# Patient Record
Sex: FEMALE | Race: WHITE | NOT HISPANIC OR LATINO | URBAN - METROPOLITAN AREA
[De-identification: names, ages, dates, MRNs, and addresses within clinical notes are randomized per-mention and may not be internally consistent; named-entity substitution may affect disease eponyms.]

---

## 2017-01-06 ENCOUNTER — HOSPITAL ENCOUNTER (OUTPATIENT)
Dept: MRI IMAGING | Facility: CLINIC | Age: 37
Discharge: HOME | End: 2017-01-06
Attending: PHYSICIAN ASSISTANT

## 2017-01-06 DIAGNOSIS — M51.26 LUMBAR DISC HERNIATION: ICD-10-CM

## 2017-01-06 DIAGNOSIS — M54.16 LUMBAR RADICULITIS: ICD-10-CM

## 2017-01-09 ENCOUNTER — COMMUNICATION - HEALTHEAST (OUTPATIENT)
Dept: PHYSICAL MEDICINE AND REHAB | Facility: CLINIC | Age: 37
End: 2017-01-09

## 2017-01-09 DIAGNOSIS — M54.16 LUMBAR RADICULITIS: ICD-10-CM

## 2017-01-10 ENCOUNTER — AMBULATORY - HEALTHEAST (OUTPATIENT)
Dept: PHYSICAL MEDICINE AND REHAB | Facility: CLINIC | Age: 37
End: 2017-01-10

## 2017-01-10 DIAGNOSIS — Z91.041 CONTRAST MEDIA ALLERGY: ICD-10-CM

## 2017-01-11 ENCOUNTER — HOSPITAL ENCOUNTER (OUTPATIENT)
Dept: PHYSICAL MEDICINE AND REHAB | Facility: CLINIC | Age: 37
Discharge: HOME | End: 2017-01-11
Attending: PHYSICIAN ASSISTANT

## 2017-01-11 DIAGNOSIS — M51.26 LUMBAR DISC HERNIATION: ICD-10-CM

## 2017-01-11 DIAGNOSIS — M54.16 LUMBAR RADICULITIS: ICD-10-CM

## 2017-01-11 DIAGNOSIS — M79.604 LOW BACK PAIN RADIATING TO RIGHT LEG: ICD-10-CM

## 2017-01-11 DIAGNOSIS — M54.50 LOW BACK PAIN RADIATING TO RIGHT LEG: ICD-10-CM

## 2017-01-25 ENCOUNTER — HOSPITAL ENCOUNTER (OUTPATIENT)
Dept: PHYSICAL MEDICINE AND REHAB | Facility: CLINIC | Age: 37
Discharge: HOME | End: 2017-01-25
Attending: PHYSICIAN ASSISTANT

## 2017-01-25 DIAGNOSIS — M51.26 LUMBAR DISC HERNIATION: ICD-10-CM

## 2017-01-25 DIAGNOSIS — M54.16 LUMBAR RADICULITIS: ICD-10-CM

## 2017-01-30 ENCOUNTER — AMBULATORY - HEALTHEAST (OUTPATIENT)
Dept: PHYSICAL MEDICINE AND REHAB | Facility: CLINIC | Age: 37
End: 2017-01-30

## 2017-01-30 DIAGNOSIS — M79.604 LOW BACK PAIN RADIATING TO RIGHT LEG: ICD-10-CM

## 2017-01-30 DIAGNOSIS — M54.50 LOW BACK PAIN RADIATING TO RIGHT LEG: ICD-10-CM

## 2017-02-10 ENCOUNTER — HOSPITAL ENCOUNTER (OUTPATIENT)
Dept: PHYSICAL MEDICINE AND REHAB | Facility: CLINIC | Age: 37
Discharge: HOME | End: 2017-02-10
Attending: ORTHOPAEDIC SURGERY

## 2017-02-10 DIAGNOSIS — M54.50 LOW BACK PAIN: ICD-10-CM

## 2017-02-22 ENCOUNTER — HOSPITAL ENCOUNTER (OUTPATIENT)
Dept: PHYSICAL MEDICINE AND REHAB | Facility: CLINIC | Age: 37
Discharge: HOME | End: 2017-02-22
Attending: PHYSICIAN ASSISTANT

## 2017-02-22 DIAGNOSIS — M51.26 LUMBAR DISC HERNIATION: ICD-10-CM

## 2017-02-22 DIAGNOSIS — M54.16 LUMBAR RADICULITIS: ICD-10-CM

## 2017-05-22 ENCOUNTER — HOSPITAL ENCOUNTER (OUTPATIENT)
Dept: PHYSICAL MEDICINE AND REHAB | Facility: CLINIC | Age: 37
Discharge: HOME | End: 2017-05-22
Attending: PHYSICIAN ASSISTANT

## 2017-05-22 DIAGNOSIS — M54.16 LUMBAR RADICULITIS: ICD-10-CM

## 2017-05-22 DIAGNOSIS — M51.26 LUMBAR DISC HERNIATION: ICD-10-CM

## 2017-05-22 ASSESSMENT — MIFFLIN-ST. JEOR: SCORE: 1668.3

## 2017-06-12 ENCOUNTER — HOSPITAL ENCOUNTER (OUTPATIENT)
Dept: PHYSICAL MEDICINE AND REHAB | Facility: CLINIC | Age: 37
Discharge: HOME | End: 2017-06-12
Attending: PHYSICIAN ASSISTANT

## 2017-06-12 DIAGNOSIS — Z91.041 CONTRAST MEDIA ALLERGY: ICD-10-CM

## 2017-06-12 DIAGNOSIS — M54.16 LUMBAR RADICULITIS: ICD-10-CM

## 2017-06-15 ENCOUNTER — HOSPITAL ENCOUNTER (OUTPATIENT)
Dept: PHYSICAL MEDICINE AND REHAB | Facility: CLINIC | Age: 37
Discharge: HOME | End: 2017-06-15
Attending: PHYSICIAN ASSISTANT

## 2017-06-15 ENCOUNTER — AMBULATORY - HEALTHEAST (OUTPATIENT)
Dept: PHYSICAL MEDICINE AND REHAB | Facility: CLINIC | Age: 37
End: 2017-06-15

## 2017-06-15 DIAGNOSIS — Z98.1 STATUS POST LUMBAR SPINAL FUSION: ICD-10-CM

## 2017-06-15 DIAGNOSIS — M54.16 LUMBAR RADICULOPATHY: ICD-10-CM

## 2017-06-15 ASSESSMENT — MIFFLIN-ST. JEOR: SCORE: 1669.67

## 2017-06-20 ENCOUNTER — HOSPITAL ENCOUNTER (OUTPATIENT)
Dept: RADIOLOGY | Facility: HOSPITAL | Age: 37
Discharge: HOME | End: 2017-06-20
Attending: PHYSICIAN ASSISTANT

## 2017-06-20 DIAGNOSIS — M54.16 LUMBAR RADICULOPATHY: ICD-10-CM

## 2017-06-22 ENCOUNTER — COMMUNICATION - HEALTHEAST (OUTPATIENT)
Dept: PHYSICAL MEDICINE AND REHAB | Facility: CLINIC | Age: 37
End: 2017-06-22

## 2017-06-22 DIAGNOSIS — M54.16 LUMBAR RADICULITIS: ICD-10-CM

## 2017-06-26 ENCOUNTER — AMBULATORY - HEALTHEAST (OUTPATIENT)
Dept: PHYSICAL MEDICINE AND REHAB | Facility: CLINIC | Age: 37
End: 2017-06-26

## 2017-07-06 ENCOUNTER — HOSPITAL ENCOUNTER (OUTPATIENT)
Dept: PHYSICAL MEDICINE AND REHAB | Facility: CLINIC | Age: 37
Discharge: HOME | End: 2017-07-06
Attending: PHYSICIAN ASSISTANT

## 2017-07-06 DIAGNOSIS — M51.26 LUMBAR DISC HERNIATION: ICD-10-CM

## 2017-07-06 DIAGNOSIS — M54.16 LUMBAR RADICULITIS: ICD-10-CM

## 2017-07-06 RX ORDER — NAPROXEN 500 MG/1
500 TABLET ORAL 2 TIMES DAILY PRN
Qty: 60 TABLET | Refills: 0 | Status: SHIPPED | OUTPATIENT
Start: 2017-07-06

## 2017-07-12 ENCOUNTER — OFFICE VISIT - HEALTHEAST (OUTPATIENT)
Dept: FAMILY MEDICINE | Facility: CLINIC | Age: 37
End: 2017-07-12

## 2017-07-12 DIAGNOSIS — Z86.32 HISTORY OF GESTATIONAL DIABETES: ICD-10-CM

## 2017-07-12 DIAGNOSIS — R03.0 ELEVATED BLOOD PRESSURE READING WITHOUT DIAGNOSIS OF HYPERTENSION: ICD-10-CM

## 2017-07-12 DIAGNOSIS — Z01.818 PRE-OP EXAM: ICD-10-CM

## 2017-07-12 DIAGNOSIS — M54.16 LUMBAR RADICULOPATHY: ICD-10-CM

## 2017-07-12 DIAGNOSIS — L83 ACANTHOSIS NIGRICANS: ICD-10-CM

## 2017-07-12 LAB — HBA1C MFR BLD: 5.6 % (ref 3.5–6)

## 2017-07-12 ASSESSMENT — MIFFLIN-ST. JEOR: SCORE: 1649.82

## 2017-07-12 NOTE — ASSESSMENT & PLAN NOTE
Previous measurements have been done with an electric blood pressure cuff and are therefore not entirely reliable.  She is elevated today with 2 checks of her blood pressure.  I suspect she doeshave a diagnosis of hypertension.  EKG was obtained and was negative.  No left ventricular hypertrophy.  Will check a thyroid hormone today.  Additionally basic metabolic panel.  Will initiate hypertensive therapy with hydrochlorothiazide 12.5 mg.  The patient will return to clinic on Thursday 7/20 for blood pressure check as well as potassium check in anticipation for her surgery the following week.

## 2017-07-12 NOTE — ASSESSMENT & PLAN NOTE
This patient has physical exam characteristics consistent with insulin resistance.  She has a history of gestational diabetes.  Both her parents have diabetes.  We will screen her for diabetes today as we draw blood.  She will require ongoing testing going forward to ensure that she does not develop insulin resistance, prediabetes, diabetes.

## 2017-07-13 LAB
ATRIAL RATE - MUSE: 87 BPM
DIASTOLIC BLOOD PRESSURE - MUSE: NORMAL MMHG
INTERPRETATION ECG - MUSE: NORMAL
P AXIS - MUSE: 18 DEGREES
PR INTERVAL - MUSE: 136 MS
QRS DURATION - MUSE: 70 MS
QT - MUSE: 362 MS
QTC - MUSE: 435 MS
R AXIS - MUSE: 9 DEGREES
SYSTOLIC BLOOD PRESSURE - MUSE: NORMAL MMHG
T AXIS - MUSE: 7 DEGREES
VENTRICULAR RATE- MUSE: 87 BPM

## 2017-07-19 ENCOUNTER — AMBULATORY - HEALTHEAST (OUTPATIENT)
Dept: NURSING | Facility: CLINIC | Age: 37
End: 2017-07-19

## 2017-07-19 ENCOUNTER — AMBULATORY - HEALTHEAST (OUTPATIENT)
Dept: FAMILY MEDICINE | Facility: CLINIC | Age: 37
End: 2017-07-19

## 2017-07-19 ENCOUNTER — AMBULATORY - HEALTHEAST (OUTPATIENT)
Dept: LAB | Facility: CLINIC | Age: 37
End: 2017-07-19

## 2017-07-19 DIAGNOSIS — Z01.818 PRE-OP EXAM: ICD-10-CM

## 2017-07-19 DIAGNOSIS — R03.0 ELEVATED BLOOD PRESSURE READING WITHOUT DIAGNOSIS OF HYPERTENSION: ICD-10-CM

## 2017-07-19 RX ORDER — HYDROCHLOROTHIAZIDE 25 MG/1
25 TABLET ORAL DAILY
Qty: 30 TABLET | Refills: 1 | Status: SHIPPED | OUTPATIENT
Start: 2017-07-19

## 2017-07-21 ASSESSMENT — MIFFLIN-ST. JEOR: SCORE: 1649.26

## 2017-07-23 ENCOUNTER — ANESTHESIA - HEALTHEAST (OUTPATIENT)
Dept: SURGERY | Facility: HOSPITAL | Age: 37
End: 2017-07-23

## 2017-07-24 ENCOUNTER — SURGERY - HEALTHEAST (OUTPATIENT)
Dept: SURGERY | Facility: HOSPITAL | Age: 37
End: 2017-07-24

## 2017-07-24 ASSESSMENT — MIFFLIN-ST. JEOR: SCORE: 1653.79

## 2017-08-03 ENCOUNTER — HOSPITAL ENCOUNTER (OUTPATIENT)
Dept: RADIOLOGY | Facility: HOSPITAL | Age: 37
Discharge: HOME | End: 2017-08-03
Attending: PHYSICIAN ASSISTANT

## 2017-08-03 DIAGNOSIS — M54.16 LUMBAR RADICULOPATHY: ICD-10-CM

## 2017-08-08 ENCOUNTER — HOSPITAL ENCOUNTER (OUTPATIENT)
Dept: PHYSICAL MEDICINE AND REHAB | Facility: CLINIC | Age: 37
Discharge: HOME | End: 2017-08-08
Attending: PHYSICIAN ASSISTANT

## 2017-08-08 DIAGNOSIS — M54.16 LUMBAR RADICULOPATHY: ICD-10-CM

## 2017-08-08 DIAGNOSIS — G47.00 INSOMNIA: ICD-10-CM

## 2017-08-08 RX ORDER — ESZOPICLONE 2 MG/1
2 TABLET, FILM COATED ORAL DAILY
Qty: 20 TABLET | Refills: 0 | Status: SHIPPED | OUTPATIENT
Start: 2017-08-08

## 2017-09-01 ENCOUNTER — HOSPITAL ENCOUNTER (OUTPATIENT)
Dept: RADIOLOGY | Facility: HOSPITAL | Age: 37
Discharge: HOME | End: 2017-09-01
Attending: PHYSICIAN ASSISTANT

## 2017-09-01 DIAGNOSIS — M54.16 LUMBAR RADICULOPATHY: ICD-10-CM

## 2017-09-05 ENCOUNTER — HOSPITAL ENCOUNTER (OUTPATIENT)
Dept: PHYSICAL MEDICINE AND REHAB | Facility: CLINIC | Age: 37
Discharge: HOME | End: 2017-09-05
Attending: PHYSICIAN ASSISTANT

## 2017-09-05 DIAGNOSIS — Z98.1 S/P LUMBAR FUSION: ICD-10-CM

## 2017-09-05 DIAGNOSIS — M54.16 LUMBAR RADICULOPATHY: ICD-10-CM

## 2021-05-30 VITALS — BODY MASS INDEX: 37.93 KG/M2 | WEIGHT: 221 LBS

## 2021-05-30 VITALS — WEIGHT: 226.7 LBS | HEIGHT: 64 IN | BODY MASS INDEX: 38.7 KG/M2

## 2021-05-30 VITALS — WEIGHT: 221 LBS | BODY MASS INDEX: 37.93 KG/M2

## 2021-05-31 VITALS — WEIGHT: 219.13 LBS | BODY MASS INDEX: 36.51 KG/M2 | HEIGHT: 65 IN

## 2021-05-31 VITALS — BODY MASS INDEX: 38.96 KG/M2 | WEIGHT: 227 LBS

## 2021-05-31 VITALS — HEIGHT: 65 IN | WEIGHT: 220 LBS | BODY MASS INDEX: 36.65 KG/M2

## 2021-05-31 VITALS — BODY MASS INDEX: 38.76 KG/M2 | WEIGHT: 227 LBS | HEIGHT: 64 IN

## 2021-05-31 VITALS — BODY MASS INDEX: 38.62 KG/M2 | WEIGHT: 225 LBS

## 2021-06-08 NOTE — PROGRESS NOTES
Assessment:   Nat Brooks is a 36 y.o. y.o. female with past medical history significant for depression, gestational diabetes, fibromyalgia, endometriosis who presents today for follow-up regarding right low back pain with radiation into the right lower extremity with associated numbness and tingling in the distribution of the right S1 nerve root.  The patient is a history of a right L5-S1 microlumbar discectomy 2014.  An MRI of the lumbar spine shows a recurrent right L5-S1 disc protrusion which abuts the right S1 nerve root.  The patient has a sensory deficit in the right S1 distribution.  She did not demonstrate any plantar flexion weakness on exam today.       Plan:     A shared decision making plan was used.  The patient's values and choices were respected.  The following represents what was discussed and decided upon by the physician assistant and the patient.      1.  DIAGNOSTIC TESTS:  I reviewed the MRI of the lumbar spine.  No further diagnostic tests were ordered.    2.  PHYSICAL THERAPY: I encouraged the patient to schedule physical therapy at the [facility].  I had placed that order when I saw the patient in consultation on December 30.    3.  MEDICATIONS:    -I refilled the patient's hydrocodone/acetaminophen 5/325 mg 1 tab every 8 hours as needed, #15 with no refills.  I did check the [place] prescription monitoring database.  She has not received any opioids since I saw her on December 30, 2016.  At that time I provided 15 tabs.  - the patient can continue using gabapentin.  She is currently using 6 mg in the morning, 300 mg in the afternoon, and 600 mg at bedtime.  She is titrating her dose up to 900 mg 3 times daily.  -The patient can continue using naproxen twice daily.  -The patient completed her Medrol Dosepak.  -The patient completed a prednisone prep in preparation for the injection today.    4.  INTERVENTIONS:  The patient is scheduled for a right L5-S1,  S1-S2 transforaminal epidural steroid injection this afternoon with Dr. Ward.    5.  PATIENT EDUCATION:  I told the patient that we will try to treat her pain conservatively.  If her pain does not improve with conservative treatment, she may need to follow up with her spine surgeon.  Hopefully, we'll be able to manage this without further surgery.  -The patient works as a [employer] employee. Her job is sedentary.  She has been able to continue working.  I recommended that she alternate sitting to standing every 30-60 minutes.  She did have a sit to stand workstation installed.    6.  FOLLOW-UP: I will see the patient back in clinic for a 2 week post procedure follow-up visit.  She has any questions or concerns.  Meantime, she should not hesitate to contact our clinic.    Subjective:     Nat Brooks is a 36 y.o. female who presents today for follow-up regarding her low back pain with radiation into the right lower extremity with associated numbness and tingling.  I saw the patient in consultation on December 30, 2016.  At that time I ordered an MRI of the lumbar spine for further evaluation.  This showed a recurrent right L5-S1 disc protrusion.  The patient is scheduled for a right L5-S1, S1-S2 transforaminal epidural steroid injection this afternoon with Dr. Ward.  The patient states that she did have some relief of her pain since she was last seen when she was taking the Medrol Dosepak.  However, once she finished the Medrol Dosepak, her pain returned.  She is also finding the gabapentin to be helpful.    The patient with right-sided low back pain.  The pain radiates into the right buttock, down the posterior thigh, into the posterior calf, ending at the heel.  She has numbness in the heel.  She rates her pain today as a 4-10.  At its best is a 4-10.  At its worse it is a 10 out of 10.  The patient's pain is aggravated with sitting in any one position for too long.  Her pain is alleviated  temporarily with repositioning.  She denies any new symptoms since she was last seen.  She denies any weakness.  She denies any left-sided symptoms.    I had ordered physical therapy for the patient.  She has not yet scheduled that.  She is currently using gabapentin 600 mg in the morning, 300 mg in the afternoon, and 600 mg at bedtime.  She is tolerating this dose well.  She is also using naproxen twice daily, since she completed her Medrol Dosepak.  She is also using Vicodin about 1 tab every other day.  She is out of that medication and does request a refill.    Past medical history is reviewed and is unchanged in the interim.    Family history is reviewed and is unchanged in the interim.    Review of Systems:  Positive for numbness/tingling.  Negative for loss of bowel/bladder control, footdrop, weakness, headache, dizziness, nausea/vomiting, blurry vision, balance changes.     Objective:   CONSTITUTIONAL:  Vital signs as above.  No acute distress.  The patient is well nourished and well groomed.    PSYCHIATRIC:  The patient is awake, alert, oriented to person, place and time.  The patient is answering questions appropriately with clear speech.  Normal affect.  HEENT: Normocephalic, atraumatic.  Sclera clear.    SKIN:  Skin over the face, posterior torso, bilateral upper and lower extremities is clean, dry, intact without rashes.  MUSCULOSKELETAL:  Gait is mildly antalgic, favoring the right.   The patient has 5/5 strength for the bilateral hip flexors, knee flexors/extensors, ankle dorsiflexors/plantar flexors, ankle evertors/invertors.    NEUROLOGICAL:  1+ patellar, trace achilles reflexes which are symmetric bilaterally.  No ankle clonus bilaterally.  Sensation to light touch is intact in the bilateral L4, L5, and S1 dermatomes.       RESULTS:  MRI of the lumbar spine from [facility] dated January 6, 2017 was reviewed.  There are postoperative changes of her right L5-S1 hemilaminectomy.  The majority of the  disc protrusion that was present on her previous scan is no longer evident and the thecal sac is adequately decompressed.  There is a small recurrent disc protrusion which abuts the right S1 nerve root.  There may be some scar tissue associated with this.  There is no foraminal stenosis.  There are prominent endplate changes and disc degeneration at this level.  The upper lumbar levels show no new disc herniation, central canal, or neural foraminal stenosis.  Please see report for further details.

## 2021-06-08 NOTE — PROGRESS NOTES
Assessment:   Nat Brooks is a 37 y.o. y.o. female with past medical history significant for depression, gestational diabetes, fibromyalgia, endometriosis who presents today for follow-up regarding right low back pain with radiation into the right lower extremity with associated numbness and tingling in the distribution of the right S1 nerve root.  The patient has a history of a right L5-S1 microlumbar discectomy 2014.  An MRI of the lumbar spine shows a recurrent right L5-S1 disc protrusion which abuts the right S1 nerve root.  The patient is status post a right L5-S1, S1-S2 transforaminal epidural steroid injection on January 11, 2017 which provided 70% relief of her pain which only lasted for 12 days.  Over the past 2 days, the patient felt her same pain returned.  She was neurologically intact on exam today except for a sensory deficit in the right S1 dermatome.       Plan:     A shared decision making plan was used.  The patient's values and choices were respected.  The following represents what was discussed and decided upon by the physician assistant and the patient.      1.  DIAGNOSTIC TESTS:  I reviewed the MRI of the lumbar spine.  No further diagnostic tests were ordered.    2.  PHYSICAL THERAPY: I had ordered physical therapy for the patient on December 30, 2017.  The patient has not yet started.  I strongly encouraged her to begin physical therapy.    3.  MEDICATIONS:  I refilled the patient's naproxen 5 mg twice daily as needed.  -The patient can continue using gabapentin 600 mg 3 times daily.  -The patient has Norco which she uses very sparingly for pain.  She declined a offer for a refill today.    4.  INTERVENTIONS:  I offered the patient a repeat right L5-S1, S1-S2 transforaminal epidural steroid injection.  I told the patient I cannot guarantee would provide any longer lasting relief, but I think would be reasonable to try if she would like to avoid surgery.  The patient indicated she  would like to think about that.  She wants to proceed. If the patient does want a repeat injection she will need to be pre-treated with prednisone.    5.  REFERRALS:  I offered her referral to [facility] neurosurgery.  The patient has a history of a right L5-S1 microlumbar discectomy 2014 at [facility].  She does not remember who her surgeon was.  The patient indicated she would like to also think about this.  She will call our clinic and let us know if she would like a referral.  She did see any of the neurosurgeons.    6.  WORKABILITY: The patient works at a [employer].  She is continuing to work.  She had a sit to stand work station installed.  She declined the need for any paperwork done today.  She does state that she has been leaving early some days because of her pain.    7.  FOLLOW-UP: We will await the patient's phone call letting us know her preferences regarding a repeat epidural steroid injection and/or a surgical referral.  I have not scheduled any additional follow-ups at this point.    Subjective:     Nat Brooks is a 37 y.o. female who presents today for follow-up regarding right low back pain with radiation into the right lower extremity with associated numbness and tingling.  The patient is status post right L5-S1, S1-S2 transforaminal epidural steroid injection on January 11, 2017.  The patient presents this provided 70% relief of her pain for about 12 days.  Over the past 2 days, she has felt her pain returning.    The patient planes of right-sided low back pain.  The pain radiates into the right buttock, down the posterior thigh, into the posterior calf.  She has numbness and tingling in the right heel.  She rates her pain today as a 4-10.  At its best it is a 3 out of 10.  At its worse it is an 8 or 10.  Her pain is aggravated with bending.  She denies any alleviating factors.  The patient does state that she is having less pain when she first wakes up in the morning compared with  before her injection.  However, as the day progresses her pain does become more severe.  She denies any weakness in her legs.    The patient has not yet started physical therapy.  I had ordered this for her on December 30.  She is using naproxen 5 mg twice daily.  She uses gabapentin 600 mg 3 times daily.  She has hydrocodone/acetaminophen available which she uses sparingly.    The patient works at a [employer].  She is continuing to work.  They installed a sit to stand workstation for her.  She states that she has needed to leave early a few days because of her pain.    Past medical history is reviewed and unchanged interim.    Family history is reviewed and is unchanged in the interim.    Review of Systems:  Positive for numbness/tingling.  Negative for loss of bowel/bladder control, footdrop, weakness, headache, dizziness, nausea/vomiting, blurry vision, balance changes.     Objective:   CONSTITUTIONAL:  Vital signs as above.  No acute distress.  The patient is well nourished and well groomed.    PSYCHIATRIC:  The patient is awake, alert, oriented to person, place and time.  The patient is answering questions appropriately with clear speech.  Normal affect.  HEENT: Normocephalic, atraumatic.  Sclera clear.    SKIN:  Skin over the face, posterior torso, bilateral upper and lower extremities is clean, dry, intact without rashes.  MUSCULOSKELETAL:  Gait is non-antalgic.   The patient has 5/5 strength for the bilateral hip flexors, knee flexors/extensors, ankle dorsiflexors/plantar flexors, ankle evertors/invertors.  Lumbar flexion and extension both severely restricted.  NEUROLOGICAL:  1+ patellar, trace achilles reflexes which are symmetric bilaterally.  No ankle clonus bilaterally.  Subjective diminished sensation in the right S1 dermatome.     RESULTS:  MRI of the lumbar spine from [facility] dated January 6, 2017 was reviewed. There are postoperative changes of her right L5-S1 hemilaminectomy. The majority of  the disc protrusion that was present on her previous scan is no longer evident and the thecal sac is adequately decompressed. There is a small recurrent disc protrusion which abuts the right S1 nerve root. There may be some scar tissue associated with this. There is no foraminal stenosis. There are prominent endplate changes and disc degeneration at this level. The upper lumbar levels show no new disc herniation, central canal, or neural foraminal stenosis. Please see report for further details.

## 2021-06-08 NOTE — PROGRESS NOTES
[facility] SPINE SURGERY SERVICE OFFICE VISIT    2/10/2017     Nat Brooks is a 37 y.o. female who is sent to us in consultation by Marci Arellano  for the evaluation of severe back and recurrent right leg pain           HPI: Nat Brooks is a 37 y.o. y.o. female with past medical history significant for depression, gestational diabetes, fibromyalgia, endometriosis who presents today for follow-up regarding right low back pain with radiation into the right lower extremity with associated numbness and tingling in the distribution of the right S1 nerve root. The patient has a history of a right L5-S1 microlumbar discectomy 2014. An MRI of the lumbar spine shows a recurrent right L5-S1 disc protrusion which abuts the right S1 nerve root. The patient is status post a right L5-S1, S1-S2 transforaminal epidural steroid injection on January 11, 2017 which provided 70% relief of her pain which only lasted for 12 days.  Patient says that her symptoms are exacerbated by any type of activity standing and walking and relieved to some degree by lying down.  Patient also has some relief from pain medications.  At this time she is miserable and frustrated and looking for a more long-term fix for her lumbar spine condition.    Past Medical History:   Diagnosis Date     Chronic back pain      Obese      Past Surgical History:   Procedure Laterality Date     MICRODISCECTOMY LUMBAR           REVIEW OF SYSTEMS:  ROS reviewed with pt as documented on pt health form of 2/10/2017.    Negative cardiac, pulmonary, hematological.  No family hx of anesthetic reactions.  No family hx of hypercoagulability.       MEDICATIONS:  Current Outpatient Prescriptions   Medication Sig Dispense Refill     diazePAM (VALIUM) 5 MG tablet Take one tab by mouth one - 1.5 hours prior to MRI.  Bring second tab to MRI appointment, may take again 15 minutes before MRI. 2 tablet 0     gabapentin (NEURONTIN) 300 MG capsule Take 1 capsule (300  mg total) by mouth daily. Increase by 1 tab every 3 days.  Max dose 900 mg tid 180 capsule 2     HYDROcodone-acetaminophen 5-325 mg per tablet Take 1 tablet by mouth every 8 (eight) hours as needed for pain. 15 tablet 0     naproxen (NAPROSYN) 500 MG tablet Take 1 tablet (500 mg total) by mouth 2 (two) times a day as needed. Take with food. 60 tablet 1     predniSONE (DELTASONE) 50 MG tablet Take 1 tab 13 hours before procedure. 2nd tab 7 hours before. 3rd tab 1 hour before. 3 tablet 0     No current facility-administered medications for this encounter.          ALLERGIES/SENSITIVITIES:     Allergies   Allergen Reactions     Contrast [Iohexol]      Gadolinium-Containing Contrast Media      Shellfish Containing Products Itching and Swelling       PERTINENT SOCIAL HISTORY:   Social History     Social History     Marital status: Single     Spouse name: N/A     Number of children: N/A     Years of education: N/A     Social History Main Topics     Smoking status: Current Every Day Smoker     Smokeless tobacco: Not on file     Alcohol use Yes      Comment: occasional     Drug use: Yes     Special: Marijuana     Sexual activity: Not on file     Other Topics Concern     Not on file     Social History Narrative         FAMILY HISTORY:  No family history on file.     PHYSICAL EXAM:   Constitutional: There were no vitals taken for this visit.     Mental Status: A & O in no acute distress.  Affect is appropriate.  Speech is fluent.  Recent and remote memory are intact.  Attention span and concentration are normal.     Observation: Grossly intact    Range of Motion: Less than 50% in all directions    Provacative Testing: Straight leg raising at 20  on the right     Motor: No pronator drift of upper extremity. Normal bulk and tone all muscle groups of upper and lower extremities.      Sensory: Sensation intact bilaterally to light touch.      Gait Pattern: Antalgic gait favoring the right lower extremity     Coordination:   Heel/toe/  gait intact.     tandem gait preserved     Reflexes; knee/ ankle jerk intact.  Negative babinski/ clonus.    IMAGING: I personally reviewed all radiographic images    MRI     Severe degenerative disc disease L5-S1 with Modic endplate changes.  Recurrent central and right disc herniation with severe S1 nerve root impingement       CONSULTATION ASSESSMENT AND PLAN:      Patient presents today for a complex spine surgery evaluation.  A shared treatment decision was performed alongside the patient.  After extensive discussion with the patient patient is opting strongly to move in the direction of an anterior posterior spinal fusion to address her recurrent disc herniation and significant chronic back pain and leg pain.  Risks and benefits were extensively discussed. Anterior spinal fusion risk were discussed including vascular injury, infection, bleeding and others. Posterior spinal fusion risk were discussed including but not limited to nerve root injury, hardware complications, infection, bleeding, pseudoarthrosis, and other potential catastrophic conditions.  If he has any other questions or concerns he certainly not hesitate to contact our surgical team and the [facility] at any time.  Patient was specifically instructed on pre-operative and scheduling protocol and was given the appropriate contact information for the surgery scheduler and the general information line.      Surgical plan:  1.  Anterior spinal fusion L5-S1 with instrumentation  2.  Posterior lumbar fusion L5-S1 with instrumentation through paraspinal muscle splitting approach        Bhanu Pittman MD  Spine Surgeon  [facility]      Cc:   No Primary Care Provider  [address] [place] [address]

## 2021-06-09 NOTE — PROGRESS NOTES
Assessment:   Nat Brooks is a 37 y.o. y.o. female with past medical history significant for depression, gestational diabetes, fibromyalgia, endometriosis who presents today for follow-up regarding right-sided low back pain with radiation into the right lower extremity with associated numbness and tingling in the distribution of the right S1 nerve root.  The patient has a history of a right L5-S1 microlumbar discectomy 2014.  MRI of lumbar spine shows a recurrent right L5-S1 disc herniation which abuts the right S1 nerve root.  The patient has been offered an anterior/posterior L5-S1 fusion by Dr. Pittman.  She is awaiting insurance authorization.  She returns today because she needs a refill of her hydrocodone/acetaminophen.  She is using 1 tablet every other day.       Plan:     A shared decision making plan was used.  The patient's values and choices were respected.  The following represents what was discussed and decided upon by the physician assistant and the patient.      1.  DIAGNOSTIC TESTS:  I reviewed the MRI of the lumbar spine.  No further diagnostic tests were ordered.    2.  PHYSICAL THERAPY: I had ordered physical therapy for the patient on December 30, 2017. The patient has not yet started. I have encouraged her to begin physical therapy.    3.  MEDICATIONS:    - I Refilled the patient's hydrocodone/acetaminophen 5/325 mg 1 tab every 8 hours as needed, #15 with no refills.  She is currently using 1 tab about every other day I asked that the patient uses this sparingly as possible.  I took the [place] prescription monitoring database.  Her most recent prescription for this was on January 11, 2017 for 15 tabs.  We reviewed the risks and benefits of this medication.  I told the patient that I can continue to refill this for her until she has her spine surgery.  If she is not going to have spine surgery within the next 3-4 months, I will not longer prescribed for her and she will need to  see a pain clinic.  -I refilled the patient's cyclobenzaprine 5-10 mg 3 times daily as needed.  -The patient can continue using naproxen 500 mg twice daily as needed.  - the patient discontinued gabapentin.  She developed blurry vision.    4.  INTERVENTIONS:  I offered the patient a repeat right L5-S1, S1-S2 transforaminal epidural steroid injection.  This injection was previous CT performed on January 11, 2017 and provided 70% relief of her pain but only lasted for 12 days.  Due to financial concerns, the patient does not feel that she could proceed with a repeat injection at this time.    5.  PATIENT EDUCATION:    -The patient's surgery authorization is currently delayed because she is a smoker.  I urged the patient to quit smoking.  I explained that continuing to smoke increases her risk of compensation with surgery including pseudoarthrosis.  She voiced understanding to this.  -The patient has some questions about surgery which I answered to the best of my ability.  She can certainly call Dr. Pittman's office for further clarification.    6.  FOLLOW-UP: I have not scheduled any routine follow-up for the patient.  I told the patient that if she needs a refill of her Norco prior to surgery, she will need to be seen in the clinic.  If she has any other questions or concerns, she should not be difficult.    Subjective:     Nat Brooks is a 37 y.o. female who presents today for follow-up regarding right-sided low back pain with radiation into the right lower extremity with associated numbness and tingling.  I last saw the patient on generic 25th, 2017.  At that time she was following up after a right L5-S1, S1-S2 transforaminal epidural steroid injection which was performed on January 11, 2017.  That injection provided 70% relief of her pain but only lasted for 12 days. I therefore referred the patient see Dr. Pittman.  The patient saw Dr. Pittman on February 10, 2017.  He offered the patient a  anterior/posterior L5-S1 fusion.  The patient wants to move forward with surgery, but her insurance has not authorized it because she is a smoker.  She returns today because she needs a refill of her hydrocodone/acetaminophen.  She is currently using 1 tab every other day.    The patient continues to complain of right-sided low back pain.  The pain radiates into the right buttock, down the right posterior thigh, and into the posterior calf.  She has numbness and tingling in the same distribution as her pain which extends into the heel of her right foot.  She rates her pain today as a 5 out of 10.  At its best is a 4-10.  At its worse it is a 10 out of 10.  The patient's pain is aggravated with any increase in activity and bending.  She denies any alleviating factors.  She feels that the right leg is weaker than the left.  She denies any new symptoms since she was last seen.    I had ordered physical therapy for the patient when I saw her initially in consultation on December 30, 2016.  She has not yet started.  She is currently using Norco 5/25 mg 1 tablet every other day.  She is using naproxen 500 mg twice daily.  She weaned herself off of gabapentin after developing blurry vision.    Past medical history is reviewed and is unchanged in the interim.    Family history is reviewed and is unchanged in the interim.    Review of Systems:  Positive for numbness/tingling, weakness, dizziness.  Negative for loss of bowel/bladder control, foot drop, headache, nausea/vomiting, blurry vision, balance changes.     Objective:   CONSTITUTIONAL:  Vital signs as above.  No acute distress.  The patient is well nourished and well groomed.    PSYCHIATRIC:  The patient is awake, alert, oriented to person, place and time.  The patient is answering questions appropriately with clear speech.  Normal affect.  HEENT: Normocephalic, atraumatic.  Sclera clear.    SKIN:  Skin over the face, posterior torso, bilateral upper and lower  extremities is clean, dry, intact without rashes.  MUSCULOSKELETAL:  Gait is non-antalgic.    The patient has 5/5 strength for the bilateral hip flexors, knee flexors/extensors, ankle dorsiflexors/plantar flexors, ankle evertors/invertors.    NEUROLOGICAL:  1+ patellar, trace achilles reflexes which are symmetric bilaterally.  No ankle clonus bilaterally.  Sensation to light touch is intact in the bilateral L4, L5, and S1 dermatomes.       RESULTS:  MRI of the lumbar spine from [facility] dated January 6, 2017 was reviewed. There are postoperative changes of her right L5-S1 hemilaminectomy. The majority of the disc protrusion that was present on her previous scan is no longer evident and the thecal sac is adequately decompressed. There is a small recurrent disc protrusion which abuts the right S1 nerve root. There may be some scar tissue associated with this. There is no foraminal stenosis. There are prominent endplate changes and disc degeneration at this level. The upper lumbar levels show no new disc herniation, central canal, or neural foraminal stenosis. Please see report for further details.

## 2021-06-10 NOTE — PROGRESS NOTES
Assessment:   Nat Brooks is a 37 y.o. y.o. female with past medical history significant for depression, gestational diabetes, fibromyalgia, endometriosis who presents today for follow-up regarding right-sided low back pain with radiation into the right lower extremity with associated numbness and tingling in the distribution of the right S1 nerve root.  The patient is a history of a right L5-S1 microlumbar discectomy 2014.  MRI of the lumbar spine shows a recurrent right L5-S1 disc herniation which abuts the right S1 nerve root.  The patient has been offered an anterior/posterior L5-S1 fusion by Dr. Pittman.  She has met her insurance requirement that she is free of nicotine for 30 days.  She is planning on scheduling surgery.  She returns to see me today because she needs a refill of her hydrocodone/acetaminophen.  Her most recent prescription was on February 22, 2017 for 15 tabs.  She is 1 tab remaining.  The patient reported some urinary urgency.  I am not concerned for cauda equina syndrome as her neurologic exam was completely normal other than a slight sensory deficit in the right S1 dermatome.  She is not having any bowel incontinence.  She is not having any saddle anesthesia.       Plan:     A shared decision making plan was used.  The patient's values and choices were respected.  The following represents what was discussed and decided upon by the physician assistant and the patient.      1.  DIAGNOSTIC TESTS: I reviewed the MRI of the lumbar spine.  No further diagnostic tests were ordered.    2.  PHYSICAL THERAPY: No physical therapy was ordered.  Dr. Pittman has recommended moving forward with a fusion surgery.    3.  MEDICATIONS: I refilled the patient's hydrocodone/acetaminophen 5/325 mg 1 tab every 8 hours as needed, #15 with no refills.  I checked the [place] prescription monitoring database.  Her most recent prescription was on February 22, 2017 for 15 tabs.  Reviewed the risks and  benefits of this medication.  The patient understands that I will only prescribe this medication until she has spine surgery.  If she continues to have to put off surgery, I will no longer prescribe for her and she will need to see a pain clinic.  -The patient can continue using naproxen as needed.  -The patient did not tolerate gabapentin because of blurry vision.    4.  INTERVENTIONS: No additional interventions were ordered.  The patient had a right L5-S1, S1-S2 transforaminal epidural steroid injection on January 11, 2017 which provided 70% relief of her pain but only lasted for 12 days.    5.  PATIENT EDUCATION: I asked the patient to call Dr. Pittman's office to schedule surgery now that she has met her insurance requirement of being nicotine free for greater than 30 days.    6.  FOLLOW-UP: The patient can follow-up with me as needed.  If she has any questions or concerns, she should not hesitate to call.    Subjective:     Nat Brooks is a 37 y.o. female who presents today for follow-up regarding right-sided low back pain with radiation into the right lower extremity with associated numbness and tingling.  I last saw the patient on February 22, 2017.  Dr. Pittman had offered her at L5-S1 fusion on February 10, 2017.  Her insurance would not authorize the surgery until she had been nicotine free for 30 days.  The patient states that her last cigarette was on April 7, 2017.  She is ready to proceed with surgery.  She returns today because she would like a refill of her hydrocodone/acetaminophen.  Her most recent prescription was on February 22, 2017 for 15 tabs.  She has 1 tab remaining.    The patient continues to complain of right-sided low back pain.  The pain radiates in the right posterior thigh, down the posterior calf, and into the plantar aspect of her foot.  The patient has numbness and tingling in the same distribution as her pain.  She rates her pain today as a 7 out of 10.  At its best  it is a 5 out of 10.  At its worst it is a 10 out of 10.  The patient's pain is aggravated with increased activity and standing and any one position for prolonged period of time.  Her pain is alleviated temporarily with repositioning.  The patient states that since she was last seen she has experienced some mild pain in the right knee which she attributes to her altered gait.  She also states that she has had mild pain in the left posterior thigh, although it is not nearly as severe as the right side.  The patient has noticed that over the past 3 weeks she has had urinary urgency and 2 episodes where she was unable to make it to the bathroom in time and lost control of her bladder.  The patient states that she has had urinary tract infections in the past and this is not typical for her urinary tract infection.  She is not having any pain or burning with urination.  She is able to start her stream readily.  She feels like she empties completely after she finishes going to the bathroom.  The urgency is not consistent, it is intermittent.  The patient denies any loss of bowel control.  The patient states that following sex she feels some numbness in her vagina, but otherwise she denies any saddle anesthesia.  She denies any increased weakness in the legs.  She denies any increased numbness or tingling in the legs.    The patient is using naproxen 1-2 tabs as needed.  She takes vicodin sparingly for pain.    Review of Systems:  Positive for numbness/tingling, loss of bladder control, weakness, headache, blurry vision, balance changes. Negative for foot drop, dizziness, nausea/vomiting.     Objective:   CONSTITUTIONAL:  Vital signs as above.  No acute distress.  The patient is well nourished and well groomed.    PSYCHIATRIC:  The patient is awake, alert, oriented to person, place and time.  The patient is answering questions appropriately with clear speech.  Normal affect.  HEENT: Normocephalic, atraumatic.  Sclera  clear.    SKIN:  Skin over the face, posterior torso, bilateral upper and lower extremities is clean, dry, intact without rashes.  MUSCULOSKELETAL:  Gait is non-antalgic.     The patient has 5/5 strength for the bilateral hip flexors, knee flexors/extensors, ankle dorsiflexors/plantar flexors, ankle evertors/invertors.    NEUROLOGICAL:  1+ patellar, trace achilles reflexes which are symmetric bilaterally.  No ankle clonus bilaterally.  Sensation is diminished in the right S1 dermatome.    RESULTS:  MRI of the lumbar spine from [facility] dated January 6, 2017 was reviewed. There are postoperative changes of her right L5-S1 hemilaminectomy. The majority of the disc protrusion that was present on her previous scan is no longer evident and the thecal sac is adequately decompressed. There is a small recurrent disc protrusion which abuts the right S1 nerve root. There may be some scar tissue associated with this. There is no foraminal stenosis. There are prominent endplate changes and disc degeneration at this level. The upper lumbar levels show no new disc herniation, central canal, or neural foraminal stenosis. Please see report for further details.

## 2021-06-11 NOTE — PROGRESS NOTES
Assessment:   Nat Brooks is a 37 y.o. y.o. female with past medical history significant for depression, gestational diabetes, fibromyalgia, endometriosis who presents today for follow-up regarding right-sided low back pain with radiation into the right lower extremity with associated numbness and tingling in the distribution of the right S1 nerve root.  The patient is a history of a right L5-S1 micro lumbar discectomy 2014.  MRI of the lumbar spine shows a recurrent right L5-S1 disc herniation.  The patient is scheduled for an anterior/posterior L5-S1 fusion with Dr. Pittman on July 24, 2017.  She is status post a right L5-S1 transforaminal epidural steroid injection on June 26, 2017.  This was performed at [facility] because the spine Center is now out of network for her.  The patient has had increased low back pain since her injection.  The patient had a slight sensory deficit in the right S1 dermatome, otherwise she is neurologically intact.       Plan:     A shared decision making plan was used.  The patient's values and choices were respected.  The following represents what was discussed and decided upon by the physician assistant and the patient.      1.  DIAGNOSTIC TESTS: I reviewed the MRI lumbar spine.  No further diagnostic tests were ordered.  Other than her chronic right S1 sensory deficit, she did not have any neurologic deficit on exam.  She has not had any loss of bowel or bladder control.  She has not had any fevers, chills, or sweats.    2.  PHYSICAL THERAPY: No physical therapy was ordered.    3.  MEDICATIONS:    -I refilled the patient's oxycodone/acetaminophen 5/325 mg 1 tab every 6 hours as needed, #72 with no refills.  This should last until her surgery on July 24.  I checked the [place] prescription monitoring database.  The patient's most recent opiate prescription was on June 12, 2017 for 30 tabs.  Patient is out.  I reviewed the risks and benefits of this medication with the  patient.  I will not provide telephone refills.  -I refilled the patient's naproxen 500 mg twice daily as needed.  I said that she should stop this medication 7 days before her surgery.  -The patient can continue using methocarbamol as needed.    4.  INTERVENTIONS: No further interventions were ordered.    5.  PATIENT EDUCATION: I told the patient that if she were to develop worsening weakness down the leg, increased numbness down the legs, or loss of bowel or bladder control she should go to the emergency department or call her clinic right away.  Additionally, if she were to develop fevers, chills, or sweats she should call the clinic or go to the emergency department.  I explained that these would be signs of an infection and/or an epidural hematoma.  Given the patient's reassuring exam today (stable S1 sensory deficit, no weakness), I think the likelihood of this occurring is low.   - The patient is in agreement the above plan.  All questions were answered.    6.  FOLLOW-UP: I have not scheduled any routine follow-up for the patient.  She is scheduled to have surgery on July 24.  If she has any questions or concerns in the meantime, she should not hesitate contact our clinic.    Subjective:     Nat Brooks is a 37 y.o. female who presents today for follow-up regarding right-sided low back pain with radiation to the right lower extremity with associated numbness and tingling.  The patient is scheduled to have a L5-S1 anterior/posterior fusion on July 24 with Dr. Pittman.  The patient is status post a right L5-S1 transforaminal injection on June 26, 2017.  This was performed at [facility].  The patient states that she has had increased low back pain since the injection.  She has also had increased leg pain and a sensation of weakness down the leg which is more severe.    The patient complains of bilateral low back pain.  Pain radiates into the right buttock, down the posterior thigh, into the posterior calf,  to the heel.  She has numbness and tingling in the right plantar and lateral foot.  The patient states that she has felt some weakness in the right leg when climbing stairs.  She rates her pain today is a 10 out of 10.  At its best it is a 7 out of 10.  At its worst it is a 7 out of 10.  The patient's pain is aggravated with increased activity and sitting and any one position for too long.  It is alleviated temporarily with applying heat.  Patient denies any loss of bowel or bladder control.  She denies any saddle anesthesia.  She denies any fevers, chills, or sweats.  She denies any left leg symptoms.    The patient had physical therapy for her low back.  The patient had been using Percocet for pain.  She request a refill of that medication.  She states it was more effective than the Vicodin.  She also uses methocarbamol 500 mg as needed.    Past medical history is reviewed and is unchanged in the interim.    Family history is reviewed and is unchanged in the interim.    Review of Systems:  Positive for numbness/tingling, weakness.  Negative for loss of bowel/bladder control, footdrop, headache, dizziness, nausea/vomiting, blurry vision, balance changes.     Objective:   CONSTITUTIONAL:  Vital signs as above.  No acute distress.  The patient is well nourished and well groomed.    PSYCHIATRIC:  The patient is awake, alert, oriented to person, place and time.  The patient is answering questions appropriately with clear speech.  Normal affect.  HEENT: Normocephalic, atraumatic.  Sclera clear.    SKIN:  Skin over the face, posterior torso, bilateral upper and lower extremities is clean, dry, intact without rashes.  MUSCULOSKELETAL:  Gait is antalgic, favoring the right.  Mild tenderness over the right lower lumbar paraspinal muscles.      The patient has 5/5 strength for the bilateral hip flexors, knee flexors/extensors, ankle dorsiflexors/plantar flexors, ankle evertors/invertors.    NEUROLOGICAL: Subjective  diminished sensation in the right S1 dermatome.     RESULTS:  MRI of the lumbar spine from [facility] dated January 6, 2017 was reviewed. There are postoperative changes of her right L5-S1 hemilaminectomy. The majority of the disc protrusion that was present on her previous scan is no longer evident and the thecal sac is adequately decompressed. There is a small recurrent disc protrusion which abuts the right S1 nerve root. There may be some scar tissue associated with this. There is no foraminal stenosis. There are prominent endplate changes and disc degeneration at this level. The upper lumbar levels show no new disc herniation, central canal, or neural foraminal stenosis. Please see report for further details.

## 2021-06-11 NOTE — PROGRESS NOTES
"SPINE SURGERY FOLLOWUP  NOTE    Nat Brooks is a 37 y.o. y.o. female with past medical history significant for depression, gestational diabetes, fibromyalgia, endometriosis who presents today for follow-up regarding right-sided low back pain with radiation to the right lower extremity with associated numbness and tingling in the distribution of the right S1 nerve root.  The patient has a history of a right L5-S1 microlumbar discectomy in 2014.  MRI of the lumbar spine shows a recurrent right L5-S1 disc herniation.  The patient's been offered an anterior/posterior L5-S1 fusion by Dr. Pittman.  She is awaiting insurance authorization for this procedure.  She should know within the next 1 week what her authorization statuses.  Hopefully she will be able to schedule surgery soon.  She returns to see me today because her pain is not well controlled and she needs a refill of pain medication.  She had been using hydrocodone/acetaminophen.  Her most recent prescription was on May 22, 2017 for 15 tabs.  The patient continues to have a slight sensory deficit in the right S1 dermatome.  She is otherwise neurologically intact.  She is scheduled for surgery in roughly 1 month.        The pts PMH, PSH, ROS, Meds, Allergies, SH, FH are all unchanged and summarized in the pts health history from last visit         PHYSICAL EXAM:   Constitutional: BP (!) 173/96 (Patient Site: Right Arm, Patient Position: Sitting)  Pulse 100  Ht 5' 4\" (1.626 m)  Wt (!) 227 lb (103 kg)  BMI 38.96 kg/m2     Mental Status: A & O in no acute distress.  Affect is appropriate.  Speech is fluent.  Recent and remote memory are intact.  Attention span and concentration are normal.        Motor: No pronator drift of upper extremity. Normal bulk and tone all muscle groups of upper and lower extremities.    Musculoskeletal: Tender to palpation lumbar spine.  ROM limited in all diretions.      Sensory: Sensation intact bilaterally to light touch. "      Coordination:   Heel/toe/ gait intact.  nml tandem gait      Reflexes; supinator, biceps, triceps, knee/ ankle jerk intact.  neg hoffmans/   neg babinski/ clonus.    IMAGING:   I personally reviewed all radiographic images   Recurrent disc herniation L5-S1     CONSULTATION ASSESSMENT AND PLAN:    Lumbar radiculopathy  -We will keep Nat out of work until she is 6 weeks postop she cannot manage at this point due to the debilitating nature of her pain despite the combinations work is made for her.  We discussed the surgery briefly what to expect postoperatively.  Should she have any future questions concerns she is not hesitate to let me know.    I spent more than 15 minutes in this apt, examining the pt, reviewing the scans, reviewing notes from chart, discussing treatment options with risks and benefits and coordinating care. >50 % clinic time was spent in face to face counseling and coordinating care    Jesus Doyle  /Dr. Zain MD      CC:     No Primary Care Provider  [address] [place] [address]

## 2021-06-11 NOTE — PROGRESS NOTES
The patient's blood pressure remains elevated.  We will increase her Hydrocort thiazide 25 mg.  She is in significant pain but given the severity of elevations with previous measurements an increase is indicated in preparation for her surgery.

## 2021-06-11 NOTE — PROGRESS NOTES
Assessment/Plan:      Visit for Preoperative Exam.      The patient is concerned that she will be mostly alone after the surgery.  If pain is not well controlled consider discharge to TCU.    No contraindication to surgery.  Able to perform >4 metabolic equivalents.    Acanthosis nigricans  This patient has physical exam characteristics consistent with insulin resistance.  She has a history of gestational diabetes.  Both her parents have diabetes.  We will screen her for diabetes today as we draw blood.  She will require ongoing testing going forward to ensure that she does not develop insulin resistance, prediabetes, diabetes.    Elevated blood pressure reading without diagnosis of hypertension  Previous measurements have been done with an electric blood pressure cuff and are therefore not entirely reliable.  She is elevated today with 2 checks of her blood pressure.  I suspect she does have a diagnosis of hypertension.  EKG was obtained and was negative.  No left ventricular hypertrophy.  Will check a thyroid hormone today.  Additionally basic metabolic panel.  Will initiate hypertensive therapy with hydrochlorothiazide 12.5 mg.  The patient will return to clinic on Thursday 7/20 for blood pressure check as well as potassium check in anticipation for her surgery the following week.    Patient approved for surgery with general or local anesthesia. Postoperative pain to be managed by surgeon during post-operative [employer] timeframe, typically 30-60 days for major surgery, and less for others. Labs will be done as indicated. No active cardiac conditions.     Subjective:     Chief complaint:Pre-op Exam (lower lumbar spinal fusion at [facility] with Dr. Pittman on 07/24/2017. )    Scheduled Procedure: lower lumbar spinal fusion  Surgery Date:  07/24/2017  Surgery Location: [facility]  Surgeon: Dr. Pittman      Current Outpatient Prescriptions   Medication Sig Dispense Refill     diazePAM  (VALIUM) 10 MG tablet Take 10 mg by mouth at bedtime as needed for anxiety.       naproxen (NAPROSYN) 500 MG tablet Take 1 tablet (500 mg total) by mouth 2 (two) times a day as needed. Take with food. 60 tablet 0     oxyCODONE-acetaminophen (PERCOCET) 5-325 mg per tablet Take 1 tablet by mouth every 6 (six) hours as needed for pain. 72 tablet 0     hydroCHLOROthiazide (HYDRODIURIL) 12.5 MG tablet Take 1 tablet (12.5 mg total) by mouth daily. 30 tablet 1     No current facility-administered medications for this visit.        Allergies   Allergen Reactions     Contrast [Iohexol]      Gadolinium-Containing Contrast Media      Shellfish Containing Products Itching and Swelling         There is no immunization history on file for this patient.    Patient Active Problem List   Diagnosis     Lumbar radiculopathy     Vitamin D deficiency     History of gestational diabetes     Acanthosis nigricans     Elevated blood pressure reading without diagnosis of hypertension       Past Medical History:   Diagnosis Date     Chronic back pain      Obese        Social History     Social History     Marital status: Single     Spouse name: N/A     Number of children: N/A     Years of education: N/A     Occupational History     Not on file.     Social History Main Topics     Smoking status: Former Smoker     Smokeless tobacco: Former User     Quit date: 4/2/2017     Alcohol use Yes      Comment: occasional     Drug use: Yes     Special: Marijuana     Sexual activity: Not on file     Other Topics Concern     Not on file     Social History Narrative       Past Surgical History:   Procedure Laterality Date     CHOLECYSTECTOMY       MICRODISCECTOMY LUMBAR         History of Present Illness: ongoing back pain including surgery.  Has been to spine care for pain management.  Quit smoking.  Ruptured disc.  Chronic pain.  Has numbness and tingling in feet.  Ongoing sciatica pain.    Recent Health  Fever: no  Chills: no  Fatigue: no  Chest Pain:  "no  Cough: no  Dyspnea: no  Urinary Frequency: no  Nausea: no  Vomiting: no  Diarrhea: no  Abdominal Pain: no  Easy Bruising: yes  Lower Extremity Swelling: no  Poor Exercise Tolerance: yes    Most recent Health Maintenance Visit:  NA    Pertinent History  Prior Anesthesia: yes  Previous Anesthesia Reaction:  no  Diabetes: no  Cardiovascular Disease: no  Pulmonary Disease: no  Renal Disease: no  GI Disease: no  Sleep Apnea: no (snores)  Thromboembolic Problems: no  Clotting Disorder: no  Bleeding Disorder: no  Transfusion Reaction: no  Impaired Immunity: no  Steroid use in the last 6 months: prednisone with spinal injection (most recent was 6/12 (150 mg in 24 hour period).   Frequent Aspirin use: no    No family history of anesthesia reaction, clotting disorder and bleeding disorder    Social history of patient does not wear denture or partial plates    After surgery, the patient plans to recover at home with family.    Review of Systems  Review of Systems   Constitutional: Negative.    HENT: Negative.    Eyes: Negative.    Respiratory: Negative.    Cardiovascular: Negative.    Gastrointestinal: Negative.    Endocrine: Negative.    Genitourinary: Negative.    Musculoskeletal: Negative.    Skin: Negative.    Neurological: Negative.    Hematological: Negative.    Psychiatric/Behavioral: Negative.              Objective:         Vitals:    07/12/17 1447 07/12/17 1511   BP: (!) 136/92 (!) 146/92   Pulse: 78    Resp: 16    Temp: 98.8  F (37.1  C)    TempSrc: Oral    Weight: 219 lb 2 oz (99.4 kg)    Height: 5' 5\" (1.651 m)        Physical Exam:  Physical Exam   Constitutional: She appears well-developed and well-nourished.   HENT:   Right Ear: External ear normal.   Left Ear: External ear normal.   Nose: Nose normal.   Mouth/Throat: Oropharynx is clear and moist.   Eyes: Conjunctivae and EOM are normal. Pupils are equal, round, and reactive to light. Right eye exhibits no discharge. Left eye exhibits no discharge.   Neck: " No thyromegaly present.   Cardiovascular: Normal rate, regular rhythm and normal heart sounds.    No murmur heard.  Pulmonary/Chest: Effort normal and breath sounds normal.   Abdominal: Soft. Bowel sounds are normal. She exhibits no distension and no mass. There is no tenderness. There is no rebound and no guarding.   Musculoskeletal: Normal range of motion.   No joint swelling or deformity.   Lymphadenopathy:     She has no cervical adenopathy.   Neurological: She is alert. She has normal reflexes.   Skin: Skin is warm and dry. No rash noted.   Acanthosis nigricans is present.  Abdominal stria is present.   Psychiatric: She has a normal mood and affect.     ECG: My personal interpretation-normal sinus rhythm.       Recent Results (from the past 24 hour(s))   Electrocardiogram Perform - Clinic   Result Value Ref Range    SYSTOLIC BLOOD PRESSURE  mmHg    DIASTOLIC BLOOD PRESSURE  mmHg    VENTRICULAR RATE 87 BPM    ATRIAL RATE 87 BPM    P-R INTERVAL 136 ms    QRS DURATION 70 ms    Q-T INTERVAL 362 ms    QTC CALCULATION (BEZET) 435 ms    P Axis 18 degrees    R AXIS 9 degrees    T AXIS 7 degrees    MUSE DIAGNOSIS       Normal sinus rhythm  Normal ECG  No previous ECGs available       During this encounter, reviewed notes from the spine care surgery.  Patient had worsening symptoms following steroid injection on 6/26 which is performed at [facility].     Undermining Type: Entire Wound

## 2021-06-11 NOTE — PROGRESS NOTES
Assessment:   Nat Brooks is a 37 y.o. y.o. female with past medical history significant for depression, gestational diabetes, fibromyalgia, endometriosis who presents today for follow-up regarding right-sided low back pain with radiation to the right lower extremity with associated numbness and tingling in the distribution of the right S1 nerve root.  The patient has a history of a right L5-S1 microlumbar discectomy in 2014.  MRI of the lumbar spine shows a recurrent right L5-S1 disc herniation.  The patient's been offered an anterior/posterior L5-S1 fusion by Dr. Pittman.  She is awaiting insurance authorization for this procedure.  She should know within the next 1 week what her authorization statuses.  Hopefully she will be able to schedule surgery soon.  She returns to see me today because her pain is not well controlled and she needs a refill of pain medication.  She had been using hydrocodone/acetaminophen.  Her most recent prescription was on May 22, 2017 for 15 tabs.  The patient continues to have a slight sensory deficit in the right S1 dermatome.  She is otherwise neurologically intact.       Plan:     A shared decision making plan was used.  The patient's values and choices were respected.  The following represents what was discussed and decided upon by the physician assistant and the patient.      1.  DIAGNOSTIC TESTS: I reviewed the MRI of the lumbar spine.  No further diagnostic tests were ordered.    2.  PHYSICAL THERAPY: No physical therapy was ordered.    3.  MEDICATIONS:    -I provided a prescription for oxycodone/acetaminophen 5/325 mg 1 tab every 8 hours as needed, #30 with no refills.  I checked the [place] prescription monitoring database.  The patient's most recent opiate prescription was on May 22, 2017 for 15 tabs of hydrocodone/acetaminophen.  The patient did not feel that the hydrocodone was effective.  The patient understands that I will not provide this medication  long-term.  If she is unable to have surgery I will refer in clinic.  I will not provide telephone refills.  -I also prescribed methocarbamol 500-1000 mg 3 times daily as needed.  The patient previously tried cyclobenzaprine and did not feel it was effective.  -The patient can continue using naproxen as needed.  -I also prescribed a prednisone prep.    4.  INTERVENTIONS: I offered the patient a repeat right L5-S1, S1-S2 translaminal epidural steroid injection.  The patient had this procedure performed at our clinic in January 2017 and it provided 70% relief of her pain but only lasted 12 days.  I told patient that I cannot guarantee it would provide longer lasting relief if we were to repeat the injection, however, the patient is quite desperate for any relief and she would like to proceed.  The patient indicates that our clinic is out of network for her and she had significant out-of-pocket costs ($1500) with her injection performed in January.  I asked the patient to call her insurance and find out if there is a different facility that is in network for her.  I be happy to refer her to a different facility, such as [facility] or [facility], if that works better for her.    5.  PATIENT EDUCATION: The patient is in agreement with the above plan.  All questions were answered.    6.  FOLLOW-UP: The patient to follow-up with me as needed.  Hopefully she will be able to have her fusion surgery in the very near future.  The patient is in severe pain.  She has any questions or concerns, she should not hesitate to call.    Subjective:     Nat Brooks is a 37 y.o. female who presents today for follow-up regarding right-sided low back pain with radiation into the right lower extremity with associated numbness and tingling.  I last saw the patient on May 22, 2017.  At that time I refilled her pain medication as she was awaiting insurance authorization for an L5-S1 fusion with Dr. Pittman.  The patient returns  today for a refill of her pain medication.  She is hoping to know within the next 1 week when her surgery will be scheduled.    The patient continues to complain of right-sided low back pain.  The pain radiates in the right buttock, down the right posterior thigh, into the posterior calf, extending into the lateral aspect of the right foot.  She has numbness and tingling in the same distribution as her pain.  She rates her pain today as a 9 out of 10.  At its best it is a 7 out of 10.  At its worst it is a 10 out of 10.  The patient states that her pain has been getting progressively worse.  She is having difficulty working because of the pain.  She also took a trip to [place] for a wedding over the weekend and had significant pain.  She denies any weakness.  She denies any loss of bowel or bladder control.    The patient takes Vicodin sparingly.  She does not feel it is helping.  She also uses naproxen as needed.    Past medical history is reviewed and is unchanged in the interim.    Family history is reviewed and is unchanged in the interim.    Review of Systems:  Negative for numbness/tingling, loss of bowel/bladder control, footdrop, weakness, headache, dizziness, nausea/vomiting, blurry vision, balance changes.     Objective:   CONSTITUTIONAL:  Vital signs as above.  No acute distress.  The patient is well nourished and well groomed.    PSYCHIATRIC:  The patient is awake, alert, oriented to person, place and time.  The patient is answering questions appropriately with clear speech.  Normal affect.  HEENT: Normocephalic, atraumatic.  Sclera clear.    SKIN:  Skin over the face, posterior torso, bilateral upper and lower extremities is clean, dry, intact without rashes.  MUSCULOSKELETAL:  Gait is non-antalgic.    The patient has 5/5 strength for the bilateral hip flexors, knee flexors/extensors, ankle dorsiflexors/plantar flexors, ankle evertors/invertors.    NEUROLOGICAL: Subjective diminished/altered sensation  in the right S1 dermatome.     RESULTS:  MRI of the lumbar spine from [facility] dated January 6, 2017 was reviewed. There are postoperative changes of her right L5-S1 hemilaminectomy. The majority of the disc protrusion that was present on her previous scan is no longer evident and the thecal sac is adequately decompressed. There is a small recurrent disc protrusion which abuts the right S1 nerve root. There may be some scar tissue associated with this. There is no foraminal stenosis. There are prominent endplate changes and disc degeneration at this level. The upper lumbar levels show no new disc herniation, central canal, or neural foraminal stenosis. Please see report for further details.

## 2021-06-12 NOTE — PROGRESS NOTES
History:    Ms Brooks is 2 weeks post AP lumbar fusion L5-S1.  She is doing very well she is very happy with the results of surgery.  She has had some difficulties sleeping at night and she has noted some cramping in her lower extremities.  No lower extremity swelling.    Exam:  Alter and oriented x 3.  vss afebrile    Incision: clean/dry/intact without evidence of infection    Neuro:     Motors  5/5 throughout w/o deficit    Sensory  Equal normal bilaterally    Imaginv lumbar : FINDINGS: 5 lumbar vertebral bodies. Anterior and left posterior fusion hardware new since the prior radiograph. Slight convex right curvature. Otherwise normal alignment. Normal vertebral body heights. Surgical clips right upper quadrant.    A/P  2 weeks AP L5-S1 fusion. Low back pain  -Ms Brooks will f/u in 4 weeks with updated xrays.  I encouraged her that her residual pain will resolve w/time.  We will start PT after our next visit.    Should the patient have any future problems/questions/concerns they will let me know.

## 2021-06-12 NOTE — ANESTHESIA CARE TRANSFER NOTE
Last vitals:   Vitals:    07/24/17 1003   BP: 123/73   Pulse: 74   Resp: 8   Temp: 36.4  C (97.6  F)   SpO2: 98%   Pt positioned on cart. OET in place and pt exchanging spontaneously -600 ml.  T-piece to recovery.  Report given and IV infusing.  Patient's level of consciousness is drowsy  Spontaneous respirations: yes  Maintains airway independently: yes  Dentition unchanged: yes  Oropharynx: oropharynx clear of all foreign objects    QCDR Measures:  ASA# 20 - Surgical Safety Checklist: ASA20A - Safety Checks Done  PQRS# 430 - Adult PONV Prevention: 4558F - Pt received => 2 anti-emetic agents (different classes) preop & intraop  ASA# 8 - Peds PONV Prevention: NA - Not pediatric patient, not GA or 2 or more risk factors NOT present  PQRS# 424 - Judi-op Temp Management: 4559F - At least one body temp DOCUMENTED => 35.5C or 95.9F within required timeframe  PQRS# 426 - PACU Transfer Protocol: - Transfer of care checklist used  ASA# 14 - Acute Post-op Pain: ASA14B - Patient did NOT experience pain >= 7 out of 10

## 2021-06-12 NOTE — PROGRESS NOTES
History:    Ms Brooks is 6 weeks post AP L5-S1 fusion. She is doing quite well.  She does not have much in the way of low back pain.  She has some dysesthesias in just her toes.  No problems with her incisions.  She is anxious to increase her activities.    Exam:  Alter and oriented x 3.  vss afebrile    Incision: clean/dry/intact without evidence of infection    Neuro:     Motors  5/5 throughout w/o deficit    Sensory  Equal normal bilaterally    Imaginv lumbar xrays: 2 view lumbar x-rays were reviewed with the patient my impression is that the pedicle screws are in perfect placement anterior interbody graft is well seated        A/P  6 weeks post AP L5-S1. Low back pain  -Korin is doing very well I will see her back in 6 weeks for her 3 month visit.  In the interim we will start physical therapy.  She will return to work.  We discussed addressing her residual dysesthesias with medication management versus giving it time we elected to just wait it out.  She wishes to try something in the future we discussed trialing gabapentin versus Lyrica.  Should the patient have any future problems/questions/concerns they will let me know.

## 2021-06-12 NOTE — ANESTHESIA POSTPROCEDURE EVALUATION
Patient: Nat Brooks  ANTERIOR FUSION L5-S1 BILATERAL, MINIMALLY INVASIVE POSTERIOR FUSION L5-S1 BILATERAL, EXPOSURE AND CLOSURE, ANTERIOR APPROACH, FOR SPINAL SURGERY, BY GENERAL SURGERY  Anesthesia type: general    Patient location: PACU  Last vitals:   Vitals:    07/24/17 1145   BP: 167/89   Pulse: 75   Resp: 10   Temp: 36.4  C (97.5  F)   SpO2: 94%     Post vital signs: stable  Level of consciousness: awake and responds to simple questions  Post-anesthesia pain: pain controlled  Post-anesthesia nausea and vomiting: no  Pulmonary: unassisted, return to baseline  Cardiovascular: stable and blood pressure at baseline  Hydration: adequate  Anesthetic events: no    QCDR Measures:  ASA# 11 - Judi-op Cardiac Arrest: ASA11B - Patient did NOT experience unanticipated cardiac arrest  ASA# 12 - Judi-op Mortality Rate: ASA12B - Patient did NOT die  ASA# 13 - PACU Re-Intubation Rate: ASA13B - Patient did NOT require a new airway mgmt  ASA# 10 - Composite Anes Safety: ASA10A - No serious adverse event  ASA# 38 - New Corneal Injury: ASA38A - No new exposure keratitis or corneal abrasion in PACU    Additional Notes:

## 2021-06-16 PROBLEM — L83 ACANTHOSIS NIGRICANS: Status: ACTIVE | Noted: 2017-07-12

## 2021-06-16 PROBLEM — Z86.32 HISTORY OF GESTATIONAL DIABETES: Status: ACTIVE | Noted: 2017-07-12

## 2021-06-16 PROBLEM — R03.0 ELEVATED BLOOD PRESSURE READING WITHOUT DIAGNOSIS OF HYPERTENSION: Status: ACTIVE | Noted: 2017-07-12

## 2021-06-16 PROBLEM — G89.4 CHRONIC PAIN ASSOCIATED WITH SIGNIFICANT PSYCHOSOCIAL DYSFUNCTION: Status: ACTIVE | Noted: 2017-07-25

## 2021-07-03 NOTE — ANESTHESIA PREPROCEDURE EVALUATION
Anesthesia Preprocedure Evaluation by Karl Banuelos MD at 7/24/2017  6:46 AM     Author: Karl Banuelos MD Service: -- Author Type: Physician    Filed: 7/24/2017  6:47 AM Date of Service: 7/24/2017  6:46 AM Status: Signed    : Karl Banuelos MD (Physician)       Anesthesia Evaluation      Patient summary reviewed     Airway   Mallampati: II   Pulmonary - negative ROS and normal exam                          Cardiovascular - normal exam  (+) hypertension, ,      Neuro/Psych    (+) neuromuscular disease,      Endo/Other    (+) obesity,      GI/Hepatic/Renal - negative ROS      Other findings: Hb 15.1, K 3.7      Dental                             Anesthesia Plan  Planned anesthetic: general endotracheal    ASA 2   Induction: intravenous   Anesthetic plan and risks discussed with: patient    Post-op plan: routine recovery

## 2021-07-28 ENCOUNTER — LAB REQUISITION (OUTPATIENT)
Dept: LAB | Facility: CLINIC | Age: 41
End: 2021-07-28
Payer: COMMERCIAL

## 2021-07-28 DIAGNOSIS — Z13.1 ENCOUNTER FOR SCREENING FOR DIABETES MELLITUS: ICD-10-CM

## 2021-07-28 DIAGNOSIS — R53.83 OTHER FATIGUE: ICD-10-CM

## 2021-07-28 DIAGNOSIS — R39.15 URGENCY OF URINATION: ICD-10-CM

## 2021-07-28 LAB
ALBUMIN SERPL-MCNC: 3.6 G/DL (ref 3.5–5)
ALP SERPL-CCNC: 74 U/L (ref 45–120)
ALT SERPL W P-5'-P-CCNC: 16 U/L (ref 0–45)
ANION GAP SERPL CALCULATED.3IONS-SCNC: 10 MMOL/L (ref 5–18)
AST SERPL W P-5'-P-CCNC: 12 U/L (ref 0–40)
BILIRUB SERPL-MCNC: 0.3 MG/DL (ref 0–1)
BUN SERPL-MCNC: 9 MG/DL (ref 8–22)
CALCIUM SERPL-MCNC: 9.1 MG/DL (ref 8.5–10.5)
CHLORIDE BLD-SCNC: 107 MMOL/L (ref 98–107)
CHOLEST SERPL-MCNC: 151 MG/DL
CO2 SERPL-SCNC: 22 MMOL/L (ref 22–31)
CREAT SERPL-MCNC: 0.85 MG/DL (ref 0.6–1.1)
ERYTHROCYTE [DISTWIDTH] IN BLOOD BY AUTOMATED COUNT: 13 % (ref 10–15)
GFR SERPL CREATININE-BSD FRML MDRD: 84 ML/MIN/1.73M2
GLUCOSE BLD-MCNC: 102 MG/DL (ref 70–125)
HCT VFR BLD AUTO: 44.6 % (ref 35–47)
HDLC SERPL-MCNC: 52 MG/DL
HGB BLD-MCNC: 14.8 G/DL (ref 11.7–15.7)
LDLC SERPL CALC-MCNC: 91 MG/DL
MCH RBC QN AUTO: 30 PG (ref 26.5–33)
MCHC RBC AUTO-ENTMCNC: 33.2 G/DL (ref 31.5–36.5)
MCV RBC AUTO: 90 FL (ref 78–100)
PLATELET # BLD AUTO: 234 10E3/UL (ref 150–450)
POTASSIUM BLD-SCNC: 4.8 MMOL/L (ref 3.5–5)
PROT SERPL-MCNC: 6.6 G/DL (ref 6–8)
RBC # BLD AUTO: 4.94 10E6/UL (ref 3.8–5.2)
SODIUM SERPL-SCNC: 139 MMOL/L (ref 136–145)
TRIGL SERPL-MCNC: 38 MG/DL
TSH SERPL DL<=0.005 MIU/L-ACNC: 1.05 UIU/ML (ref 0.3–5)
WBC # BLD AUTO: 15.5 10E3/UL (ref 4–11)

## 2021-07-28 PROCEDURE — 80053 COMPREHEN METABOLIC PANEL: CPT | Mod: ORL | Performed by: PHYSICIAN ASSISTANT

## 2021-07-28 PROCEDURE — 87086 URINE CULTURE/COLONY COUNT: CPT | Mod: ORL | Performed by: PHYSICIAN ASSISTANT

## 2021-07-28 PROCEDURE — 85027 COMPLETE CBC AUTOMATED: CPT | Performed by: PHYSICIAN ASSISTANT

## 2021-07-28 PROCEDURE — 80061 LIPID PANEL: CPT | Performed by: PHYSICIAN ASSISTANT

## 2021-07-28 PROCEDURE — 84443 ASSAY THYROID STIM HORMONE: CPT | Performed by: PHYSICIAN ASSISTANT

## 2021-07-29 LAB — BACTERIA UR CULT: NORMAL

## 2021-08-21 ENCOUNTER — HEALTH MAINTENANCE LETTER (OUTPATIENT)
Age: 41
End: 2021-08-21

## 2021-08-31 ENCOUNTER — LAB REQUISITION (OUTPATIENT)
Dept: LAB | Facility: CLINIC | Age: 41
End: 2021-08-31
Payer: COMMERCIAL

## 2021-08-31 DIAGNOSIS — D72.829 ELEVATED WHITE BLOOD CELL COUNT, UNSPECIFIED: ICD-10-CM

## 2021-08-31 LAB
BASOPHILS # BLD AUTO: 0 10E3/UL (ref 0–0.2)
BASOPHILS NFR BLD AUTO: 0 %
EOSINOPHIL # BLD AUTO: 0.2 10E3/UL (ref 0–0.7)
EOSINOPHIL NFR BLD AUTO: 2 %
ERYTHROCYTE [DISTWIDTH] IN BLOOD BY AUTOMATED COUNT: 12.6 % (ref 10–15)
HCT VFR BLD AUTO: 45.1 % (ref 35–47)
HGB BLD-MCNC: 15 G/DL (ref 11.7–15.7)
IMM GRANULOCYTES # BLD: 0 10E3/UL
IMM GRANULOCYTES NFR BLD: 0 %
LYMPHOCYTES # BLD AUTO: 1.3 10E3/UL (ref 0.8–5.3)
LYMPHOCYTES NFR BLD AUTO: 13 %
MCH RBC QN AUTO: 29.4 PG (ref 26.5–33)
MCHC RBC AUTO-ENTMCNC: 33.3 G/DL (ref 31.5–36.5)
MCV RBC AUTO: 88 FL (ref 78–100)
MONOCYTES # BLD AUTO: 1 10E3/UL (ref 0–1.3)
MONOCYTES NFR BLD AUTO: 9 %
NEUTROPHILS # BLD AUTO: 7.7 10E3/UL (ref 1.6–8.3)
NEUTROPHILS NFR BLD AUTO: 76 %
NRBC # BLD AUTO: 0 10E3/UL
NRBC BLD AUTO-RTO: 0 /100
PLATELET # BLD AUTO: 143 10E3/UL (ref 150–450)
RBC # BLD AUTO: 5.1 10E6/UL (ref 3.8–5.2)
RETICS # AUTO: 0.08 10E6/UL (ref 0.01–0.11)
RETICS/RBC NFR AUTO: 1.6 % (ref 0.8–2.7)
WBC # BLD AUTO: 10.2 10E3/UL (ref 4–11)

## 2021-08-31 PROCEDURE — 85025 COMPLETE CBC W/AUTO DIFF WBC: CPT | Mod: ORL | Performed by: PHYSICIAN ASSISTANT

## 2021-08-31 PROCEDURE — 85045 AUTOMATED RETICULOCYTE COUNT: CPT | Mod: ORL | Performed by: PHYSICIAN ASSISTANT

## 2021-09-01 LAB
PATH REPORT.COMMENTS IMP SPEC: NORMAL
PATH REPORT.COMMENTS IMP SPEC: NORMAL
PATH REPORT.FINAL DX SPEC: NORMAL
PATH REPORT.MICROSCOPIC SPEC OTHER STN: NORMAL
PATH REPORT.RELEVANT HX SPEC: NORMAL

## 2021-10-16 ENCOUNTER — HEALTH MAINTENANCE LETTER (OUTPATIENT)
Age: 41
End: 2021-10-16

## 2022-09-25 ENCOUNTER — HEALTH MAINTENANCE LETTER (OUTPATIENT)
Age: 42
End: 2022-09-25

## 2023-02-04 ENCOUNTER — HEALTH MAINTENANCE LETTER (OUTPATIENT)
Age: 43
End: 2023-02-04

## 2023-10-14 ENCOUNTER — HEALTH MAINTENANCE LETTER (OUTPATIENT)
Age: 43
End: 2023-10-14